# Patient Record
Sex: FEMALE | Race: WHITE | URBAN - NONMETROPOLITAN AREA
[De-identification: names, ages, dates, MRNs, and addresses within clinical notes are randomized per-mention and may not be internally consistent; named-entity substitution may affect disease eponyms.]

---

## 2022-08-08 ENCOUNTER — OFFICE VISIT (OUTPATIENT)
Dept: URBAN - NONMETROPOLITAN AREA CLINIC 4 | Facility: CLINIC | Age: 87
End: 2022-08-08

## 2022-10-03 ENCOUNTER — WEB ENCOUNTER (OUTPATIENT)
Dept: URBAN - METROPOLITAN AREA CLINIC 54 | Facility: CLINIC | Age: 87
End: 2022-10-03

## 2022-10-03 ENCOUNTER — OFFICE VISIT (OUTPATIENT)
Dept: URBAN - METROPOLITAN AREA CLINIC 54 | Facility: CLINIC | Age: 87
End: 2022-10-03
Payer: MEDICARE

## 2022-10-03 VITALS
SYSTOLIC BLOOD PRESSURE: 84 MMHG | DIASTOLIC BLOOD PRESSURE: 66 MMHG | HEIGHT: 64 IN | TEMPERATURE: 97.4 F | WEIGHT: 110 LBS | BODY MASS INDEX: 18.78 KG/M2 | HEART RATE: 71 BPM

## 2022-10-03 DIAGNOSIS — I82.411 DEEP VEIN THROMBOSIS (DVT) OF FEMORAL VEIN OF RIGHT LOWER EXTREMITY, UNSPECIFIED CHRONICITY: ICD-10-CM

## 2022-10-03 DIAGNOSIS — N17.9 ACUTE KIDNEY INJURY: ICD-10-CM

## 2022-10-03 DIAGNOSIS — R18.8 OTHER ASCITES: ICD-10-CM

## 2022-10-03 DIAGNOSIS — K74.69 OTHER CIRRHOSIS OF LIVER: ICD-10-CM

## 2022-10-03 PROBLEM — 19943007: Status: ACTIVE | Noted: 2022-10-03

## 2022-10-03 PROBLEM — 389026000: Status: ACTIVE | Noted: 2022-10-03

## 2022-10-03 PROCEDURE — 99205 OFFICE O/P NEW HI 60 MIN: CPT | Performed by: INTERNAL MEDICINE

## 2022-10-03 RX ORDER — DULOXETINE 30 MG/1
1 CAPSULE CAPSULE, DELAYED RELEASE PELLETS ORAL ONCE A DAY
Status: ACTIVE | COMMUNITY

## 2022-10-03 RX ORDER — FUROSEMIDE 20 MG/1
1 TABLET TABLET ORAL ONCE A DAY
Status: ACTIVE | COMMUNITY

## 2022-10-03 RX ORDER — LEVETIRACETAM 250 MG/1
1 TABLET TABLET, FILM COATED ORAL
Status: ACTIVE | COMMUNITY

## 2022-10-03 RX ORDER — POTASSIUM CHLORIDE 10 MEQ/1
1 TABLET WITH FOOD TABLET, FILM COATED, EXTENDED RELEASE ORAL TWICE A DAY
Status: ACTIVE | COMMUNITY

## 2022-10-03 NOTE — PHYSICAL EXAM GASTROINTESTINAL
Abdomen , soft, nontender, nondistended , no guarding or rigidity , no masses palpable , normal bowel sounds , Liver and Spleen,  +mild ascites  , liver nontender

## 2022-10-03 NOTE — HPI-TODAY'S VISIT:
89 yo female with increased abdominal distension and girth with imaging with pcp with finding of new onset of ascites.  Pt with history of  HTN, CKD and hypothyroidism. Patient has had new ascites onset 2 months ago.  Pt with ascites without significant associated pain. No hx of heavy etoh use. Does not have underlying DM.  Had a blood blister in the rt foot poorly healing and has noted difficulty with wound healing. Pt reports that she still has blistering of the left foot.  +peripheral edema. Denies GI bleeding. No wt loss.

## 2022-10-16 LAB
A/G RATIO: 0.8
ACTIN (SMOOTH MUSCLE) ANTIBODY (IGG): <20
AFP, SERUM, TUMOR MARKER: 2.4
ALBUMIN: 2.7
ALKALINE PHOSPHATASE: 156
ALPHA-1-ANTITRYPSIN (AAT) PHENOTYPE: (no result)
ALT (SGPT): 11
ANA SCREEN, IFA: NEGATIVE
AST (SGOT): 28
BILIRUBIN, TOTAL: 1.4
BUN/CREATININE RATIO: 10
BUN: 12
CALCIUM: 8.4
CARBON DIOXIDE, TOTAL: 26
CERULOPLASMIN: 32
CHLORIDE: 102
CREATININE: 1.22
EGFR: 42
FERRITIN, SERUM: 408
GLOBULIN, TOTAL: 3.4
GLUCOSE: 112
HCV RNA, QUANTITATIVE REAL TIME PCR: <1.18
HCV RNA, QUANTITATIVE REAL TIME PCR: <15
HEMATOCRIT: 35.7
HEMOGLOBIN: 12.6
HEPATITIS A AB, TOTAL: (no result)
HEPATITIS B SURFACE AB IMMUNITY, QN: <5
HEPATITIS B SURFACE ANTIGEN: (no result)
HEPATITIS C ANTIBODY: (no result)
HEREDITARY HEMOCHROMATOSIS DNA MUT: (no result)
INDEX: 0.03
INR: 1.3
IRON BIND.CAP.(TIBC): 147
IRON SATURATION: 36
IRON: 53
MCH: 32.3
MCHC: 35.3
MCV: 91.5
MITOCHONDRIAL (M2) ANTIBODY: <=20
MPV: 9.8
PLATELET COUNT: 308
POTASSIUM: 3.7
PROTEIN, TOTAL: 6.1
PT: 12.8
RDW: 12.7
RED BLOOD CELL COUNT: 3.9
SODIUM: 139
T-TRANSGLUTAMINASE (TTG) IGA: <1
WHITE BLOOD CELL COUNT: 4.1

## 2022-11-21 ENCOUNTER — DASHBOARD ENCOUNTERS (OUTPATIENT)
Age: 87
End: 2022-11-21

## 2022-12-05 ENCOUNTER — TELEPHONE ENCOUNTER (OUTPATIENT)
Dept: URBAN - METROPOLITAN AREA CLINIC 54 | Facility: CLINIC | Age: 87
End: 2022-12-05

## 2022-12-05 ENCOUNTER — OFFICE VISIT (OUTPATIENT)
Dept: URBAN - METROPOLITAN AREA CLINIC 54 | Facility: CLINIC | Age: 87
End: 2022-12-05

## 2022-12-05 RX ORDER — LEVETIRACETAM 250 MG/1
1 TABLET TABLET, FILM COATED ORAL
COMMUNITY

## 2022-12-05 RX ORDER — DULOXETINE 30 MG/1
1 CAPSULE CAPSULE, DELAYED RELEASE PELLETS ORAL ONCE A DAY
COMMUNITY

## 2022-12-05 RX ORDER — POTASSIUM CHLORIDE 10 MEQ/1
1 TABLET WITH FOOD TABLET, FILM COATED, EXTENDED RELEASE ORAL TWICE A DAY
COMMUNITY

## 2022-12-05 RX ORDER — FUROSEMIDE 20 MG/1
1 TABLET TABLET ORAL ONCE A DAY
COMMUNITY